# Patient Record
Sex: FEMALE | URBAN - METROPOLITAN AREA
[De-identification: names, ages, dates, MRNs, and addresses within clinical notes are randomized per-mention and may not be internally consistent; named-entity substitution may affect disease eponyms.]

---

## 2022-07-22 ENCOUNTER — OFFICE VISIT (OUTPATIENT)
Dept: URBAN - METROPOLITAN AREA CLINIC 76 | Facility: CLINIC | Age: 42
End: 2022-07-22
Payer: COMMERCIAL

## 2022-07-22 DIAGNOSIS — G43.809 OTHER MIGRAINE, NOT INTRACTABLE, WITHOUT STATUS MIGRAINOSUS: Primary | ICD-10-CM

## 2022-07-22 DIAGNOSIS — H43.392 OTHER VITREOUS OPACITIES, LEFT EYE: ICD-10-CM

## 2022-07-22 DIAGNOSIS — H52.13 MYOPIA, BILATERAL: ICD-10-CM

## 2022-07-22 PROCEDURE — 99203 OFFICE O/P NEW LOW 30 MIN: CPT | Performed by: OPTOMETRIST

## 2022-07-22 ASSESSMENT — KERATOMETRY
OD: 44.13
OS: 44.00

## 2022-07-22 ASSESSMENT — INTRAOCULAR PRESSURE
OS: 12
OD: 12

## 2022-07-22 ASSESSMENT — VISUAL ACUITY
OD: 20/20
OS: 20/20

## 2022-07-22 NOTE — IMPRESSION/PLAN
Impression: Myopia, bilateral: H52.13. Bilateral. Plan: Discussed condition in detail with patient. Dispensed Rx for glasses to patient and instructed on normal adaptation period.

## 2022-07-22 NOTE — IMPRESSION/PLAN
Impression: Other vitreous opacities, left eye: H43.392. Left. Plan: Discussed signs and symptoms of PVD/floaters. Signs and symptoms of retinal detachment were discussed in detail. There is no evidence of retina pathology. No treatment is required at this time. Patient instructed to call immediately if any signs or symptoms of retinal detachments occur.

## 2022-07-22 NOTE — IMPRESSION/PLAN
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 Bilateral. 
- Ocular migraine without headache
-lasting for about 20-30 minutes, on average occur about 1 time per month
-onset years ago Plan: Discussed diagnosis in detail with patient. Continue to monitor without tx, patient to monitor for possible triggers. Reviewed signs and symptoms. Advised following up with PCP if symptoms worsen, for possible consult with neurology.